# Patient Record
Sex: FEMALE | Race: WHITE | NOT HISPANIC OR LATINO | Employment: OTHER | URBAN - METROPOLITAN AREA
[De-identification: names, ages, dates, MRNs, and addresses within clinical notes are randomized per-mention and may not be internally consistent; named-entity substitution may affect disease eponyms.]

---

## 2019-02-06 PROBLEM — K05.322 CHRONIC PERIODONTITIS, GENERALIZED, MODERATE: Status: ACTIVE | Noted: 2019-02-06

## 2019-03-12 PROBLEM — F03.90 DEMENTIA: Status: ACTIVE | Noted: 2019-03-12

## 2019-03-12 PROBLEM — J44.9 COPD (CHRONIC OBSTRUCTIVE PULMONARY DISEASE): Status: ACTIVE | Noted: 2019-03-12

## 2019-03-12 PROBLEM — R45.851 SUICIDAL IDEATION: Status: ACTIVE | Noted: 2019-03-12

## 2019-03-12 PROBLEM — K94.23 PEG TUBE MALFUNCTION: Status: ACTIVE | Noted: 2019-03-12

## 2019-03-21 ENCOUNTER — TELEPHONE (OUTPATIENT)
Dept: NEUROLOGY | Facility: CLINIC | Age: 84
End: 2019-03-21

## 2019-03-21 NOTE — TELEPHONE ENCOUNTER
----- Message from Dru Pires sent at 3/21/2019  2:57 PM CDT -----  Type:  Sooner Apoointment Request    Caller is requesting a sooner appointment.  Caller declined first available appointment listed below.  Caller will not accept being placed on the waitlist and is requesting a message be sent to doctor.    Name of Caller: Babatunde Sotomayor   When is the first available appointment?  Symptoms:  Dementia   Best Call Back Number:  [phone]  Additional Information:  Patient is currently in [facility], patient need an appointment for dementia. The hospital called requesting to schedule an appointment for patient to see neurologist for dementia.

## 2019-03-21 NOTE — TELEPHONE ENCOUNTER
Spoke with ANH Burns, scheduled the pt in the first available appt. He plans to e-mail the POA documentation to me and I will forward to Dr Mcdaniel nurse.

## 2019-05-03 ENCOUNTER — OFFICE VISIT (OUTPATIENT)
Dept: NEUROLOGY | Facility: CLINIC | Age: 84
End: 2019-05-03
Payer: MEDICARE

## 2019-05-03 VITALS
SYSTOLIC BLOOD PRESSURE: 105 MMHG | WEIGHT: 99.81 LBS | RESPIRATION RATE: 20 BRPM | DIASTOLIC BLOOD PRESSURE: 68 MMHG | BODY MASS INDEX: 20.12 KG/M2 | HEART RATE: 91 BPM | HEIGHT: 59 IN

## 2019-05-03 DIAGNOSIS — G30.1 LATE ONSET ALZHEIMER'S DISEASE WITH BEHAVIORAL DISTURBANCE: Primary | ICD-10-CM

## 2019-05-03 DIAGNOSIS — F02.818 LATE ONSET ALZHEIMER'S DISEASE WITH BEHAVIORAL DISTURBANCE: Primary | ICD-10-CM

## 2019-05-03 PROCEDURE — 99204 OFFICE O/P NEW MOD 45 MIN: CPT | Mod: S$PBB,,, | Performed by: PSYCHIATRY & NEUROLOGY

## 2019-05-03 PROCEDURE — 99999 PR PBB SHADOW E&M-EST. PATIENT-LVL III: CPT | Mod: PBBFAC,,, | Performed by: PSYCHIATRY & NEUROLOGY

## 2019-05-03 PROCEDURE — 99204 PR OFFICE/OUTPT VISIT, NEW, LEVL IV, 45-59 MIN: ICD-10-PCS | Mod: S$PBB,,, | Performed by: PSYCHIATRY & NEUROLOGY

## 2019-05-03 PROCEDURE — 99999 PR PBB SHADOW E&M-EST. PATIENT-LVL III: ICD-10-PCS | Mod: PBBFAC,,, | Performed by: PSYCHIATRY & NEUROLOGY

## 2019-05-03 PROCEDURE — 99213 OFFICE O/P EST LOW 20 MIN: CPT | Mod: PBBFAC,PN | Performed by: PSYCHIATRY & NEUROLOGY

## 2019-05-03 RX ORDER — MULTIVITAMIN
1 TABLET ORAL DAILY
COMMUNITY

## 2019-05-03 RX ORDER — HYDROXYZINE HYDROCHLORIDE 25 MG/1
25 TABLET, FILM COATED ORAL NIGHTLY
COMMUNITY

## 2019-05-03 RX ORDER — PAROXETINE HYDROCHLORIDE 20 MG/1
20 TABLET, FILM COATED ORAL EVERY MORNING
Qty: 30 TABLET | Refills: 11
Start: 2019-05-03 | End: 2019-05-23

## 2019-05-03 RX ORDER — GABAPENTIN 300 MG/1
300 CAPSULE ORAL 2 TIMES DAILY
COMMUNITY

## 2019-05-03 NOTE — PROGRESS NOTES
Date: 5/3/2019    Patient ID: Esther Soto is a 84 y.o. female.    Chief Complaint: Dementia      History of Present Illness:  Ms. Soto is a 84 y.o. female who presents referred by [facility]. The patient was accompanied by her sister and caregiver from [facility] who also contributed to the following history.     She resides in [facility] in the skilled unit. Her sister states she has had dementia for about 2 years or so. She has been at [facility] for 12 months. She was recently at [facility]. She had a UTI and had worsening confusion, agitation, and combative behavior. She typically is more calm but the [facility] worker notes that she can get agitated or combative sometimes. She has a PEG tube and has pulled it out a few times.     No history of seizures. No family history of dementia. Her sister reports that she tried aricept but didn't tolerate it. She didn't make any more.     She overall sleeps well. She takes remeron and atarax and xanax for anxiety and sleep.     Review of Systems:   UTO secondary to severe dementia.     Allergies:  Review of patient's allergies indicates:   Allergen Reactions    Sulfa (sulfonamide antibiotics)        Current Medications:  Current Outpatient Medications   Medication Sig Dispense Refill    calcium carbonate (OS-KWESI) 500 mg calcium (1,250 mg) tablet Take 1 tablet by mouth 2 (two) times daily.      gabapentin (NEURONTIN) 300 MG capsule Take 300 mg by mouth 3 (three) times daily.      hydrOXYzine HCl (ATARAX) 25 MG tablet Take 25 mg by mouth daily as needed for Itching.      mirtazapine (REMERON) 15 MG tablet Take 15 mg by mouth every evening.      multivitamin (THERAGRAN) per tablet Take 1 tablet by mouth once daily.      paroxetine (PAXIL) 20 MG tablet Take 1 tablet (20 mg total) by mouth every morning. 30 tablet 11     No current facility-administered medications for this visit.        Past Medical History:  Past Medical History:  "  Diagnosis Date    Anxiety     COPD (chronic obstructive pulmonary disease)     Dementia     Hypertension     Malnutrition     MDD (major depressive disorder)     UTI (urinary tract infection)        Past Surgical History:  Past Surgical History:   Procedure Laterality Date    EGD (ESOPHAGOGASTRODUODENOSCOPY) N/A 3/13/2019    Per[facility]    GASTROSTOMY TUBE PLACEMENT      INSERTION, PEG TUBE N/A 3/13/2019    Performed by [facility]que [facility] at [facility]    RESTORATION, TOOTH/ Root planing and scaling, Sub Gingival currettage N/A 2/6/2019    Performed by Santo Jasso III, DDS at [facility]       Family History:  No family history of dementia per the sister    Social History:   reports that she has never smoked. She has never used smokeless tobacco. She reports that she does not drink alcohol or use drugs.    Physical Exam:  Vitals:    05/03/19 1300   BP: 105/68   Pulse: 91   Resp: 20   Weight: 45.3 kg (99 lb 12.8 oz)   Height: 4' 11" (1.499 m)   PainSc: 0-No pain     Body mass index is 20.16 kg/m².  General: thin, no distress  Eyes: no ocular hemorrhages  Musculoskeletal: No obvious joint deformities, moves all extremities well.  Peripheral vascular: No edema noted    Neurological Exam: Severely limited due to severe dementia  Mental status: Disoriented to time and place, unable to give any history  Speech/Language: not able to hold a conversation, nor answer questions appropriately, cannot follow commands  Cranial nerves: face symmetric, PERRL  Motor: moves all extremities well with seemingly normal strength throughout. Normal bulk and tone.   Sensation: Intact throughout   DTR: 2+ at the knees and biceps bilaterally.  Coordination: No tremor.   Gait: wheelchair bound    Data:  I have personally reviewed the referring provider's notes, labs, & imaging made available to me today.       Labs:  CBC:   Lab Results   Component Value Date    HGB 14.1 02/06/2019     BMP: "   Lab Results   Component Value Date     02/06/2019    K 4.8 02/06/2019     02/06/2019    CO2 34 (H) 02/06/2019    BUN 32 (H) 02/06/2019    CREATININE 0.66 02/06/2019    GLU 98 02/06/2019    CALCIUM 9.5 02/06/2019     LFTS; No results found for: PROT, ALBUMIN, BILITOT, AST, ALKPHOS, ALT, GGT  COAGS: No results found for: INR, PROTIME, PTT  FLP: No results found for: CHOL, HDL, LDLCALC, TRIG, CHOLHDL      Imaging:  None    I reviewed records from [facility] and summarized in HPI.     Assessment and Plan:  Ms. Soto is a 84 y.o. female referred to me by [facility] for severe dementia. She has severe dementia and has been in a severe state for over 1 year per the patient. She clearly had worsening delirium from the UTI but has baseline agitation and combative behavior per the [facility] staff. I would recommend discontinuing the xanax and start paxil instead at 20 mg daily. Seroquel could also be considered at low doses such as 12.5 mg PRN for sleep or agitation if her QTc is normal.     I will see her back in 3-6 months or sooner if needed.       Late onset Alzheimer's disease with behavioral disturbance    Other orders  -     paroxetine (PAXIL) 20 MG tablet; Take 1 tablet (20 mg total) by mouth every morning.  Dispense: 30 tablet; Refill: 11

## 2019-05-23 ENCOUNTER — TELEPHONE (OUTPATIENT)
Dept: NEUROLOGY | Facility: CLINIC | Age: 84
End: 2019-05-23

## 2019-05-23 RX ORDER — PAROXETINE HYDROCHLORIDE 40 MG/1
40 TABLET, FILM COATED ORAL EVERY MORNING
Qty: 30 TABLET | Refills: 11
Start: 2019-05-23 | End: 2023-05-10

## 2019-05-23 NOTE — TELEPHONE ENCOUNTER
----- Message from Judy Mcdaniel sent at 5/23/2019 10:59 AM CDT -----  Contact: Alban dale [facility]  Pt was seen on may 3rd and medication is not effective and to please give her a call back at [phone] and fax # [phone]

## 2019-05-23 NOTE — TELEPHONE ENCOUNTER
Spoke with Alban at [facility] and updated her on Dr. Mcdaniel new instructions for Paxil increase; instructed to monitor x1 week and call us if she had any questions or additional concerns; verbalized understanding.

## 2019-06-05 ENCOUNTER — TELEPHONE (OUTPATIENT)
Dept: NEUROLOGY | Facility: CLINIC | Age: 84
End: 2019-06-05

## 2019-06-10 NOTE — TELEPHONE ENCOUNTER
Spoke with Amy at [facility]; pt shows no signs of sleepiness; pt continues to pull at PEG tube and tying it in knots; pt turns herself upside down in recliner chair where her feet are on top where her head should be; pt also tries to throw her feet and body over the side of the chair where she tries to fall to the floor; please advise.

## 2019-06-10 NOTE — TELEPHONE ENCOUNTER
Spoke with nurse at [facility]; states pt now has a gerichair with lap table and it seems to help pt at this point.

## 2019-08-01 ENCOUNTER — TELEPHONE (OUTPATIENT)
Dept: NEUROLOGY | Facility: CLINIC | Age: 84
End: 2019-08-01

## 2019-08-01 NOTE — TELEPHONE ENCOUNTER
Spoke with Mila at [facility]. Informed that the soonest available was in September; rescheduled.

## 2019-08-01 NOTE — TELEPHONE ENCOUNTER
----- Message from Nick Smith sent at 8/1/2019 12:37 PM CDT -----  Contact: Gilma   Type:  Sooner Apoointment Request    Caller is requesting a sooner appointment.  Caller declined first available appointment listed below.  Caller will not accept being placed on the waitlist and is requesting a message be sent to doctor.    Name of Caller: Gilma   When is the first available appointment? September   Symptoms:   Memory loss 3 month   Best Call Back Number: [phone]     Additional Information:  Is requesting a date within the next 2 weeks, she need to reschedule appt on  8/5.

## 2019-09-05 ENCOUNTER — OFFICE VISIT (OUTPATIENT)
Dept: NEUROLOGY | Facility: CLINIC | Age: 84
End: 2019-09-05
Payer: MEDICARE

## 2019-09-05 VITALS — RESPIRATION RATE: 20 BRPM | BODY MASS INDEX: 20.13 KG/M2 | HEIGHT: 59 IN | WEIGHT: 99.88 LBS

## 2019-09-05 DIAGNOSIS — G47.9 SLEEP DISTURBANCE: ICD-10-CM

## 2019-09-05 DIAGNOSIS — F02.818 LATE ONSET ALZHEIMER'S DISEASE WITH BEHAVIORAL DISTURBANCE: Primary | ICD-10-CM

## 2019-09-05 DIAGNOSIS — R46.89 COMBATIVE BEHAVIOR: ICD-10-CM

## 2019-09-05 DIAGNOSIS — G30.1 LATE ONSET ALZHEIMER'S DISEASE WITH BEHAVIORAL DISTURBANCE: Primary | ICD-10-CM

## 2019-09-05 PROCEDURE — 99999 PR PBB SHADOW E&M-EST. PATIENT-LVL III: CPT | Mod: PBBFAC,,, | Performed by: PSYCHIATRY & NEUROLOGY

## 2019-09-05 PROCEDURE — 99213 OFFICE O/P EST LOW 20 MIN: CPT | Mod: PBBFAC,PN | Performed by: PSYCHIATRY & NEUROLOGY

## 2019-09-05 PROCEDURE — 99214 PR OFFICE/OUTPT VISIT, EST, LEVL IV, 30-39 MIN: ICD-10-PCS | Mod: S$PBB,,, | Performed by: PSYCHIATRY & NEUROLOGY

## 2019-09-05 PROCEDURE — 99999 PR PBB SHADOW E&M-EST. PATIENT-LVL III: ICD-10-PCS | Mod: PBBFAC,,, | Performed by: PSYCHIATRY & NEUROLOGY

## 2019-09-05 PROCEDURE — 99214 OFFICE O/P EST MOD 30 MIN: CPT | Mod: S$PBB,,, | Performed by: PSYCHIATRY & NEUROLOGY

## 2019-09-05 RX ORDER — QUETIAPINE FUMARATE 25 MG/1
25 TABLET, FILM COATED ORAL DAILY PRN
Qty: 30 TABLET | Refills: 11 | Status: SHIPPED | OUTPATIENT
Start: 2019-09-05 | End: 2020-09-04

## 2019-09-05 NOTE — PROGRESS NOTES
Date: 9/5/2019    Patient ID: Esther Soto is a 84 y.o. female.    Chief Complaint: Alzheimer's      History of Present Illness:  Ms. Soto is a 84 y.o. female who presents for followup of severe dementia. The patient was accompanied by her caregiver who gave the following history.     She has severe dementia. At the last appointment, we started paxil due to agitation. This was increased to 40 mg daily. Since that time she has been intermittently combative. Last week she was combative but this week she has been better. She is constantly talking. She has PEG tube     Review of Systems:   All other systems were reviewed and negative except as mentioned in the HPI    Allergies:  Review of patient's allergies indicates:   Allergen Reactions    Sulfa (sulfonamide antibiotics)        Current Medications:  Current Outpatient Medications   Medication Sig Dispense Refill    calcium carbonate (OS-KWESI) 500 mg calcium (1,250 mg) tablet Take 1 tablet by mouth 2 (two) times daily.      gabapentin (NEURONTIN) 300 MG capsule Take 300 mg by mouth 3 (three) times daily.      hydrOXYzine HCl (ATARAX) 25 MG tablet Take 25 mg by mouth daily as needed for Itching.      mirtazapine (REMERON) 15 MG tablet Take 15 mg by mouth every evening.      multivitamin (THERAGRAN) per tablet Take 1 tablet by mouth once daily.      paroxetine (PAXIL) 40 MG tablet Take 1 tablet (40 mg total) by mouth every morning. 30 tablet 11    QUEtiapine (SEROQUEL) 25 MG Tab Take 1 tablet (25 mg total) by mouth daily as needed (combativeness). 30 tablet 11     No current facility-administered medications for this visit.        Past Medical History:  Past Medical History:   Diagnosis Date    Anxiety     COPD (chronic obstructive pulmonary disease)     Dementia     Hypertension     Malnutrition     MDD (major depressive disorder)     UTI (urinary tract infection)        Past Surgical History:  Past Surgical History:   Procedure Laterality Date  "   EGD (ESOPHAGOGASTRODUODENOSCOPY) N/A 3/13/2019    Performed by [facility] at [facility] ENDO    GASTROSTOMY TUBE PLACEMENT      INSERTION, PEG TUBE N/A 3/13/2019    Performed by [facility]an [facility] at [facility] ENDO    RESTORATION, TOOTH/ Root planing and scaling, Sub Gingival currettage N/A 2/6/2019    Performed by Santo Jasso III, DDS at [facility]       Family History:  Family history is unknown by patient.    Social History:   reports that she has never smoked. She has never used smokeless tobacco. She reports that she does not drink alcohol or use drugs.    Physical Exam:  Vitals:    09/05/19 1256   Resp: 20   Weight: 45.3 kg (99 lb 13.9 oz)   Height: 4' 11" (1.499 m)   PainSc: 0-No pain     Body mass index is 20.17 kg/m².  General: Well developed, well nourished.  No acute distress.  Musculoskeletal: No obvious joint deformities, moves all extremities well.  Peripheral vascular: No edema noted    Neurological Exam:  Mental status: Awake and alert. Disoriented  Speech language: she is talking incessantly but doesn't make sense. Doesn't follow commands well  Cranial nerves: Face symmetric  Motor: Moves all extremities equally  Coordination: No ataxia. No tremor.     Data:  I have personally reviewed other provider's notes, labs, & imaging made available to me today.       Labs:  CBC:   Lab Results   Component Value Date    HGB 14.1 02/06/2019     BMP:   Lab Results   Component Value Date     02/06/2019    K 4.8 02/06/2019     02/06/2019    CO2 34 (H) 02/06/2019    BUN 32 (H) 02/06/2019    CREATININE 0.66 02/06/2019    GLU 98 02/06/2019    CALCIUM 9.5 02/06/2019     LFTS; No results found for: PROT, ALBUMIN, BILITOT, AST, ALKPHOS, ALT, GGT  COAGS: No results found for: INR, PROTIME, PTT  FLP: No results found for: CHOL, HDL, LDLCALC, TRIG, CHOLHDL      Assessment and Plan:  Ms. Soto is a 84 y.o. female here for followup of severe dementia with behavioral disturbance. She is " intermittently combative. I will continue paxil 40 mg daily. We will try to add seroquel 25 mg daily PRN for combativeness to see if that helps. She will continue on remeron for sleep. Will see her back in 3 months.     Late onset Alzheimer's disease with behavioral disturbance    Combative behavior    Sleep disturbance    Other orders  -     QUEtiapine (SEROQUEL) 25 MG Tab; Take 1 tablet (25 mg total) by mouth daily as needed (combativeness).  Dispense: 30 tablet; Refill: 11

## 2020-01-21 ENCOUNTER — TELEPHONE (OUTPATIENT)
Dept: NEUROLOGY | Facility: CLINIC | Age: 85
End: 2020-01-21

## 2020-01-21 NOTE — TELEPHONE ENCOUNTER
Returned call to Mila Dueñas; Mila was already gone for the day had to leave a message that we currently don't have availability but to let us know if the pt's needs refills; pt last saw Dr Mcdaniel 9/5/19.

## 2020-01-21 NOTE — TELEPHONE ENCOUNTER
----- Message from Beulah Henson sent at 1/21/2020  3:44 PM CST -----  Contact: andre with [facility] [phone]   andre with [facility] ph#[phone]   Stated they have been waiting about 3 weeks for a phone call back to schedule follow up.

## 2020-02-03 ENCOUNTER — TELEPHONE (OUTPATIENT)
Dept: NEUROLOGY | Facility: CLINIC | Age: 85
End: 2020-02-03

## 2020-03-03 ENCOUNTER — TELEPHONE (OUTPATIENT)
Dept: NEUROLOGY | Facility: CLINIC | Age: 85
End: 2020-03-03

## 2020-03-03 NOTE — TELEPHONE ENCOUNTER
Returned call to pt's sister; no answer; LVM that pt has been put on Dr Mcdaniel wait list for when she returns from maternity leave.

## 2020-03-03 NOTE — TELEPHONE ENCOUNTER
----- Message from Tirso Han sent at 3/3/2020 10:12 AM CST -----  Contact: Merry  Type: Needs Medical Advice    Who Called:  Patient's sister Merry  Symptoms (please be specific):   How long has patient had these symptoms:    Pharmacy name and phone #:    Best Call Back Number: [phone]  Additional Information: requesting a call back to schedule appt,stated left messages no response.patient is established I tried to schedule appt unable to

## 2020-05-20 PROBLEM — Z43.1 PEG (PERCUTANEOUS ENDOSCOPIC GASTROSTOMY) ADJUSTMENT/REPLACEMENT/REMOVAL: Status: ACTIVE | Noted: 2020-05-20

## 2020-05-22 ENCOUNTER — TELEPHONE (OUTPATIENT)
Dept: NEUROLOGY | Facility: CLINIC | Age: 85
End: 2020-05-22

## 2020-05-22 NOTE — TELEPHONE ENCOUNTER
Contacted Kyra to schedule a follow up for her dementia.  Patient is unable to leave the facility until July and a call back is requested.

## 2020-06-16 NOTE — TELEPHONE ENCOUNTER
----- Message from Ac Galindo sent at 6/5/2019  4:12 PM CDT -----  Contact: emery Rae  Type: Needs Medical Advice    Who Called:  Emery    Best Call Back Number: [phone]  Additional Information: Emery states paroxetine (PAXIL) 40 MG tablet has not improved the condition and behaviors of the pt. She states the behaviors have likely gotten worse and is requesting a call back to discuss a possible med change or increase.     